# Patient Record
Sex: FEMALE | Race: WHITE
[De-identification: names, ages, dates, MRNs, and addresses within clinical notes are randomized per-mention and may not be internally consistent; named-entity substitution may affect disease eponyms.]

---

## 2018-04-30 ENCOUNTER — HOSPITAL ENCOUNTER (INPATIENT)
Dept: HOSPITAL 50 - VM.ED | Age: 78
Discharge: ANOTHER HOSPITAL | DRG: 871 | End: 2018-04-30
Attending: INTERNAL MEDICINE | Admitting: INTERNAL MEDICINE
Payer: MEDICARE

## 2018-04-30 DIAGNOSIS — F41.9: ICD-10-CM

## 2018-04-30 DIAGNOSIS — F39: ICD-10-CM

## 2018-04-30 DIAGNOSIS — R19.00: ICD-10-CM

## 2018-04-30 DIAGNOSIS — E87.6: ICD-10-CM

## 2018-04-30 DIAGNOSIS — Z79.82: ICD-10-CM

## 2018-04-30 DIAGNOSIS — E27.9: ICD-10-CM

## 2018-04-30 DIAGNOSIS — E78.5: ICD-10-CM

## 2018-04-30 DIAGNOSIS — G93.40: ICD-10-CM

## 2018-04-30 DIAGNOSIS — A41.9: Primary | ICD-10-CM

## 2018-04-30 DIAGNOSIS — Z79.899: ICD-10-CM

## 2018-04-30 DIAGNOSIS — M81.0: ICD-10-CM

## 2018-04-30 DIAGNOSIS — G03.9: ICD-10-CM

## 2018-04-30 LAB
CHLORIDE SERPL-SCNC: 102 MMOL/L (ref 98–107)
SODIUM SERPL-SCNC: 140 MMOL/L (ref 136–145)

## 2018-04-30 PROCEDURE — 009U3ZX DRAINAGE OF SPINAL CANAL, PERCUTANEOUS APPROACH, DIAGNOSTIC: ICD-10-PCS | Performed by: NURSE ANESTHETIST, CERTIFIED REGISTERED

## 2018-04-30 PROCEDURE — S0077 INJECTION, CLINDAMYCIN PHOSP: HCPCS

## 2018-04-30 NOTE — EDM.PDOC
ED HPI GENERAL MEDICAL PROBLEM





- General


Chief Complaint: General


Stated Complaint: N/V; Fevers


Time Seen by Provider: 04/30/18 07:31


Source of Information: Reports: Patient, EMS Notes Reviewed, RN, RN Notes 

Reviewed


History Limitations: Reports: No Limitations





- History of Present Illness


INITIAL COMMENTS - FREE TEXT/NARRATIVE: 


Patient is brought to the ED at [facility] via EMS for a 2 day history of 

fevers, flu-like symptoms, N/V, and weakness. No close family members or 

contacts with similar symptoms. Patient was given 4mg Zofran per EMS. No for 

neurological deficits. Patient denies any chest pain or SOB. Patient states she 

has vomited 6-7 times since yesterday. She states the vomitus is green/yellow. 

She also states her stools have been black but formed.


Onset Date: 04/28/18


  ** Lower Back


Pain Score (Numeric/FACES): 7





  ** Left Ear


Pain Score (Numeric/FACES): 5





- Related Data


 Allergies











Allergy/AdvReac Type Severity Reaction Status Date / Time


 


No Known Allergies Allergy   Verified 04/30/18 07:58











Home Meds: 


 Home Meds





Aspirin 81 mg DAILY 04/30/18 [History]


Beta-Carotene(A) W-C & E/Min [Vision Vitamins] 2 tab BID 04/30/18 [History]


Calcium Citrate/Vitamin D2 [Nicanor-Citrate Plus Vitamin D Tab] 1 tab DAILY 04/30/ 18 [History]


Denosumab [Prolia] 1 ml SUBCUT Q6M 04/30/18 [History]


Escitalopram [Lexapro] 20 mg DAILY 04/30/18 [History]


Simvastatin [Zocor] 10 mg DAILY 04/30/18 [History]











ED ROS GENERAL





- Review of Systems


Review Of Systems: See Below


Constitutional: Reports: Fever, Weakness, Decreased Appetite.  Denies: Chills


Respiratory: Denies: Shortness of Breath, Cough


Cardiovascular: Denies: Chest Pain, Palpitations


GI/Abdominal: Reports: Abdominal Pain, Black Stool, Nausea, Vomiting


Skin: Reports: No Symptoms


Neurological: Denies: Dizziness, Headache





ED EXAM, GENERAL





- Physical Exam


Exam: See Below


Exam Limited By: No Limitations


General Appearance: Alert, No Apparent Distress


Respiratory/Chest: No Respiratory Distress, Lungs Clear, Normal Breath Sounds


Cardiovascular: Normal Peripheral Pulses, Regular Rate, Rhythm


Peripheral Pulses: 2+: Radial (L), Radial (R)


GI/Abdominal: Soft, Non-Tender, Abnormal Bowel Sounds (Hypoactive)


Extremities: Normal Inspection


Neurological: Alert, Oriented


Skin Exam: Warm, Dry, Intact, Normal Color





Course





- Vital Signs


Last Recorded V/S: 


 Last Vital Signs











Temp  38.0 C   04/30/18 07:35


 


Pulse  109 H  04/30/18 07:35


 


Resp  16   04/30/18 07:35


 


BP  162/78 H  04/30/18 07:35


 


Pulse Ox  95   04/30/18 07:35














- Orders/Labs/Meds


Orders: 


 Active Orders 24 hr











 Category Date Time Status


 


 Admission Status [Patient Status] [ADT] Routine ADT  04/30/18 09:08 Ordered


 


 Abdomen 2V AP Flat Upright [CR] Stat Exams  04/30/18 07:51 Taken


 


 Abdomen Pelvis w Cont [CT] Stat Exams  04/30/18 08:55 Ordered


 


 CULTURE BLOOD [BC] Stat Lab  04/30/18 07:59 Received


 


 CULTURE BLOOD [BC] Stat Lab  04/30/18 08:05 Received


 


 INFLUENZA A+B AG SCREEN [RM] Stat Lab  04/30/18 08:23 Ordered


 


 UA W/MICROSCOPIC [URIN] Stat Lab  04/30/18 08:57 Ordered


 


 Sodium Chloride 0.9% [Saline Flush] Med  04/30/18 07:45 Active





 10 ml FLUSH ASDIRECTED PRN   


 


 Blood Culture x2 Reflex Set [OM.PC] Stat Oth  04/30/18 07:42 Ordered


 


 Peripheral IV Insertion Adult [OM.PC] Routine Oth  04/30/18 07:45 Ordered








 Medication Orders





Sodium Chloride (Saline Flush)  10 ml FLUSH ASDIRECTED PRN


   PRN Reason: Keep Vein Open








Labs: 


 Laboratory Tests











  04/30/18 04/30/18 04/30/18 Range/Units





  07:59 07:59 07:59 


 


WBC  14.0 H    (4.0-10.0)  x10^3/uL


 


RBC  4.43    (4.00-5.50)  x10^6/uL


 


Hgb  13.7    (12.0-16.0)  g/dL


 


Hct  40.7    (33.0-47.0)  %


 


MCV  91.9    (78.0-93.0)  fL


 


MCH  30.9    (26.0-32.0)  pg


 


MCHC  33.7    (32.0-36.0)  g/dL


 


RDW Coeff of Ana  14.2    (10.0-15.0)  %


 


Plt Count  211    (130-400)  x10^3/uL


 


Neut % (Auto)  87.8 H    (50.0-80.0)  %


 


Lymph % (Auto)  2.4 L    (25.0-50.0)  %


 


Mono % (Auto)  9.7    (2.0-11.0)  %


 


Eos % (Auto)  0.0    (0.0-4.0)  %


 


Baso % (Auto)  0.1 L    (0.2-1.2)  %


 


Sodium   140   (136-145)  mmol/L


 


Potassium   2.9 L*   (3.5-5.1)  mmol/L


 


Chloride   102   ()  mmol/L


 


Carbon Dioxide   22   (21-32)  mmol/L


 


Anion Gap   18.9   (10-20)  mmol/L


 


BUN   10   (7-18)  mg/dL


 


Creatinine   0.7   (0.55-1.02)  mg/dL


 


Est Cr Clr Drug Dosing   53.36   mL/min


 


Estimated GFR (MDRD)   > 60   


 


Glucose   187 H   ()  mg/dL


 


Lactic Acid    1.6  (0.4-2.0)  mmol/L


 


Calcium   8.5   (8.5-10.1)  mg/dL


 


Corrected Calcium   8.58   (8.5-10.1)  mg/dL


 


Magnesium   1.9   (1.8-2.4)  mg/dL


 


Total Bilirubin   1.2 H   (0.2-1.0)  mg/dL


 


AST   21   (15-37)  U/L


 


ALT   33   (14-59)  U/L


 


Alkaline Phosphatase   80   ()  U/L


 


C-Reactive Protein   10.0 H   (<=0.9)  mg/dL


 


Total Protein   7.7   (6.4-8.2)  g/dL


 


Albumin   3.9   (3.4-5.0)  g/dL


 


Globulin   3.8   


 


Albumin/Globulin Ratio   1.03   


 


Amylase   28   ()  U/L


 


Lipase   59 L   ()  U/L











Meds: 


Medications











Generic Name Dose Route Start Last Admin





  Trade Name Freq  PRN Reason Stop Dose Admin


 


Sodium Chloride  10 ml  04/30/18 07:45  





  Saline Flush  FLUSH   





  ASDIRECTED PRN   





  Keep Vein Open   





     





     





     














Discontinued Medications














Generic Name Dose Route Start Last Admin





  Trade Name Freq  PRN Reason Stop Dose Admin


 


Lactated Ringer's  1,000 mls @ 999 mls/hr  04/30/18 07:45  04/30/18 07:48





  Ringers, Lactated  IV  04/30/18 08:45  999 mls/hr





  ONETIME ONE   Administration





     





     





     





     


 


Iopamidol  100 ml  04/30/18 09:04  





  Isovue-300 (61%)  IVPUSH  04/30/18 09:05  





  ONETIME ONE   





     





     





     





     














- Radiology Interpretation


Free Text/Narrative:: 


Abd 2V: No acute findings - see scanned report in EMR





Departure





- Departure


Time of Disposition: 09:10


Disposition: Admitted As Inpatient 67


Condition: Good


Clinical Impression: 


 Weakness





Nausea & vomiting


Qualifiers:


 Vomiting type: bilious vomiting Qualified Code(s): R11.14 - Bilious vomiting





Fever


Qualifiers:


 Fever type: unspecified Qualified Code(s): R50.9 - Fever, unspecified








- Discharge Information





- Problem List Review


Problem List Initiated/Reviewed/Updated: Yes





- My Orders


Last 24 Hours: 


My Active Orders





04/30/18 07:42


Blood Culture x2 Reflex Set [OM.PC] Stat 





04/30/18 07:45


Sodium Chloride 0.9% [Saline Flush]   10 ml FLUSH ASDIRECTED PRN 


Peripheral IV Insertion Adult [OM.PC] Routine 





04/30/18 07:51


Abdomen 2V AP Flat Upright [CR] Stat 





04/30/18 07:59


CULTURE BLOOD [BC] Stat 





04/30/18 08:05


CULTURE BLOOD [BC] Stat 





04/30/18 08:23


INFLUENZA A+B AG SCREEN [RM] Stat 





04/30/18 08:55


Abdomen Pelvis w Cont [CT] Stat 





04/30/18 08:57


UA W/MICROSCOPIC [URIN] Stat 





04/30/18 09:08


Admission Status [Patient Status] [ADT] Routine 














- Assessment/Plan


Last 24 Hours: 


My Active Orders





04/30/18 07:42


Blood Culture x2 Reflex Set [OM.PC] Stat 





04/30/18 07:45


Sodium Chloride 0.9% [Saline Flush]   10 ml FLUSH ASDIRECTED PRN 


Peripheral IV Insertion Adult [OM.PC] Routine 





04/30/18 07:51


Abdomen 2V AP Flat Upright [CR] Stat 





04/30/18 07:59


CULTURE BLOOD [BC] Stat 





04/30/18 08:05


CULTURE BLOOD [BC] Stat 





04/30/18 08:23


INFLUENZA A+B AG SCREEN [RM] Stat 





04/30/18 08:55


Abdomen Pelvis w Cont [CT] Stat 





04/30/18 08:57


UA W/MICROSCOPIC [URIN] Stat 





04/30/18 09:08


Admission Status [Patient Status] [ADT] Routine 











Assessment:: 


Nausea and Vomiting


Weakness


Fever


Plan: 


Case discussed with Dr. Chelsi Alonso. Patient will be admitted acute under 

her service. Patient aware and agrees with plan.

## 2018-04-30 NOTE — HP
CHIEF COMPLAINT:  Fever, vomitting, and weakness 

 

HISTORY OF PRESENT ILLNESS:  This is a 77-year-old female, who was in her normal

state of health last evening per her daughter, who had actually talked to her on

the phone, when she developed some vomiting.  She tells me very clearly that she

vomited 7 or 8 times before coming in and there was no blood in her vomit.  She

denied any abdominal pain, but has had some back discomfort.  No neck pain or 

Headache.  No burning with urination.  The EMS reported blurred vision but I 
didn't get

that report until later.  She has had some cough, but no troubled breathing.  
Influenza

testing was normal in the ER.  Her white count was 14,000.  UA was requested,

but is pending.  She did receive 1 g of Rocephin.  Lipase was normal at 59.

Potassium mildly low at 2.9.  She was receiving IV potassium.  She had a CT of

the abdomen, which showed bilateral adrenal lesions and a mass in her pelvis. ? 
Metastasis.

She had been normally healthy up until this point.  Her  states she has

been quite active, working out in the yard.  When I reassessed her to tell her

the CT results, she was able only to answer questions yes or no usually

appropriately, but her condition had greatly deteriorated from the morning, 
therefore I ordered a

head CT.

 

ALLERGIES:  None.

 

MEDICATIONS:  Her medication list is reviewed.  She has been on albuterol p.r.n.

She did have a respiratory infection pneumonia 1 year ago, but recovered

uneventfully.  She is on aspirin 81 mg daily, vitamins with beta-carotene,

calcium and D.  She is on Prolia.  She is on Lexapro 10 mg daily and Zocor 10 mg

daily.

 

PAST MEDICAL HISTORY:  Includes hyperlipidemia, mood disorder related to some

anxiety with her  having memory loss, osteoporosis.  She has been quite

healthy.

 

SOCIAL HISTORY:  She does not smoke.  She is .  I spoke with her

daughter, Alondra, on the phone.  Her number is actually [phone].  

has lung disease and some memory problems from what I gather.

 

REVIEW OF SYSTEMS:

As stated in HPI.  Currently, it is unobtainable, but earlier this morning when

I visited with her, there had been no weight changes.  She had felt the fever up

to like 101.  She, otherwise, had not been having any shortness of breath.  She

did have 1 darker bowel movement yesterday.  Her  stated it was a larger

bowel movement, but there was no diarrhea or ongoing black stools.  

 

PHYSICAL EXAMINATION:

Vital Signs:  Include a temperature of 100.9, pulse 112, blood pressure 151/78,

respiratory rate 14, and O2 is 97% on room air.  She did come in by ambulance.

She was on some oxygen with 92% prior to applying that per report.  Repeat

temperature later when she was more confused was 103 rectally.

General:  She is in no acute distress.

Heart:  Regular rate and rhythm.

Respiratory:  Lung sounds are clear to auscultation bilaterally.

Abdomen:  Nondistended, nontender.

Extremities:  Warm and dry.  No edema.

Neurologic:  On her first exam, she was able to follow commands and answer

questions.  On her second exam, she was not able to answer questions.  She was

able to squeeze my hands and follow some commands, but not fully.  She was not

moving her legs for me.  Her pupils were equal, reactive to light, but she would

open her eyes to command.

 

LABORATORY DATA:  Otherwise, her lab work was reviewed.  Again, white count

14,000, hemoglobin 13.7, platelets 211.  Sodium 140, potassium 2.9.  She is on

her second bolus of KCl, another 20 mEq.  Chloride 102, bicarb 22, glucose 187.

Lactic originally 1.6, repeat pending.  Magnesium 1.9.  Bilirubin 1.2.  CRP was

10.  ALT 33 and AST 21.  Albumin 3.9.  CT, again as stated in HPI, shows

bilateral adrenal lesions, suspect some metastatic disease.  Also, a lesion in

the pelvis, suspect possibly GYN related.

 

ASSESSMENT AND PLAN:

1. At this point, the patient was admitted for acute cares.  We continued IV

    potassium.  We continued IV fluids.  CT of the head and chest was requested

    to rule out other metastatic disease.  She did have some contrast from her

    CT earlier, which may affect the reading, but Radiology is aware and

    looking more for metastatic disease than an acute bleed, as she has no

    focal deficits.

2. Acute encephalopathy, likely due to fever.  We will give her some rectal

    Tylenol.  There is concern this could be meningitis.  She has already

    received 1 g of Rocephin.  We will give her a second gram.  Blood cultures

    were already ordered.  I will confirm radiology report with the

    radiologist.

3. Adjustment disorder.  We will hold her other medications with Lexapro

    currently, this is a chronic problem, including for her osteopenia (prolia 
last in Dec/17)

   and hyperlipidemia.  We will only give her essential medications.  We will

    continue her on IV fluids.  I have already updated her family once.  We

    will also update them again after the CT.  She may very likely require

    transfer to a higher level of care.  She is a code level 1.





Addendum CT return with air in the subarachnoid space discussed with the 
radiologist this

could be from a gas forming organism so transfer and LP were arranged.  

 

 

MKA:  04/30/2018 13:14:20  MODL:  04/30/2018 13:36:56

Job #:  [id number]

MAXIMINO

## 2018-04-30 NOTE — PCM.PRNOTE
- Free Text/Narrative


Note: 





I was called by Chelsi Alonso D.O. 2 perform lumbar puncture on a patient with 

suspected meningitis. The patient was found to be verbally unresponsive. Does 

moan slightly to pain. No informed consent was given to the patient due to 

this. Dr. Alonso had told the patient's family that we would be doing this.





Patient was placed in a right lateral decubitus. I donned a Mask and sterile 

gloves. The lumbar back region was prepped from L1 to the lower aspect of the 

sacrum, and from the edge to edge. This was accomplished using ChloraPrep. I 

scrubbed vigorously for approximately 1-1/2 minutes. The prep was allowed to 

dry completely before I placed sterile drapes. Sterile technique was maintained 

throughout. I located the L4-5 interspace. Then infiltrated that interspace 

with 1% Xylocaine approximately 1 mL. I then placed a 22-gauge To needle 

using a midline approach and advanced with some slight redirection I was able 

to attain access to the subarachnoid space. Removed the stylette and there was 

fluid return in the hub. I then collected 4 vials in order numerically. Each 

vial contained between 1-2 mL of CSF. No blood was noted in the CSF. The color 

was yellow and slight cloudiness to it. I added each of the tubes office I 

collected them. These were sealed and then sent to lab for analysis. Needle was 

removed. Band-Aid was applied.

## 2018-05-01 NOTE — DISCH
PRIMARY DISCHARGE DIAGNOSES:

1. Meningitis, suspect due to gas-forming organism with air in the

    subarachnoid space by CT.  Fever and vomiting as symptoms.

2. Acute encephalopathy due to meningitis.  The patient did receive IV

    Rocephin and IV clindamycin and IV Keppra for seizure prophylaxis.

3. Hypokalemia replaced IV due to inability to take p.o. she got 20 mEq IV and

    was on her second bolus.

4. Sepsis secondary to meningitis.

5. Underlying hyperlipidemia and osteoporosis.

 

REASON FOR TRANSFER:  This 77-year-old female was admitted.  She had a

deterioration in her neurologic status.  She was unable to answer questions,

follow commands.  LP was requested and CRNA is performing that at the time of

this transfer dictation.  I had already contacted [facility] and arranged transfer

to Dr. Garcia in the ICU.  Neurology, Dr. Murdock is also aware along with 
Infectious

Disease.

 

The patient's temperature at one point, spiked up over 103.  She did receive

some rectal Tylenol.  Otherwise, she has been on IV fluids.  Jaquez was put in

and she had nearly 2 L out.  UA was sent and is pending.  Influenza testing is

negative.  CT's were also done, which did show her to have bilateral adrenal

lesions and left adnexal mass, no Pneumonia was noted on the chest CT.  She 
which will need further workup 

in the future for the adrenal and adnexal findings.  She had been coughing 
prior to her admission

but influenza was negative.  Her daughter had spoke with her the evening

before and she was sounding okay.  She had been out doing her yard work over 
the weekend.

 

Discharge transfer vitals were stable.  She was mildly tachycardic still, but

not hypotensive.  O2 saturations were 97% on room air.

 

Forty minutes of critical care time spent with the patient.

 

 

MKA:  04/30/2018 14:13:45  MODL:  05/01/2018 09:01:40

Job #:  [id number]

[email or web address]